# Patient Record
Sex: FEMALE | Race: WHITE | NOT HISPANIC OR LATINO | Employment: OTHER | URBAN - METROPOLITAN AREA
[De-identification: names, ages, dates, MRNs, and addresses within clinical notes are randomized per-mention and may not be internally consistent; named-entity substitution may affect disease eponyms.]

---

## 2022-09-13 ENCOUNTER — OFFICE VISIT (OUTPATIENT)
Dept: URGENT CARE | Facility: CLINIC | Age: 82
End: 2022-09-13
Payer: MEDICARE

## 2022-09-13 VITALS
TEMPERATURE: 97 F | DIASTOLIC BLOOD PRESSURE: 86 MMHG | SYSTOLIC BLOOD PRESSURE: 142 MMHG | HEIGHT: 67 IN | BODY MASS INDEX: 31.39 KG/M2 | OXYGEN SATURATION: 100 % | WEIGHT: 200 LBS | HEART RATE: 78 BPM | RESPIRATION RATE: 16 BRPM

## 2022-09-13 DIAGNOSIS — R10.31 RIGHT LOWER QUADRANT ABDOMINAL PAIN: Primary | ICD-10-CM

## 2022-09-13 PROCEDURE — G0463 HOSPITAL OUTPT CLINIC VISIT: HCPCS | Performed by: PHYSICIAN ASSISTANT

## 2022-09-13 PROCEDURE — 99213 OFFICE O/P EST LOW 20 MIN: CPT | Performed by: PHYSICIAN ASSISTANT

## 2022-09-13 RX ORDER — PAROXETINE 10 MG/1
10 TABLET, FILM COATED ORAL DAILY
COMMUNITY
Start: 2022-08-27

## 2022-09-13 NOTE — PROGRESS NOTES
[address]        NAME: Paulette Ocasio is a 78 y o  female  : 1940    MRN: [id number]  DATE: 2022  TIME: 6:35 PM    Assessment and Plan   Right lower quadrant abdominal pain [R10 31]  1  Right lower quadrant abdominal pain       Patient with symptoms for 1 month without acute changes  No other red flag symptoms for acute infection at this time  Will have patient monitor restrictions shortness with ER symptoms worsen any way  Advised follow-up with PCP  Patient Instructions   Go to ER symptoms worsen any way  Follow up with family doctor for additional evaluation  Follow up with PCP in 3-5 days  Proceed to  ER if symptoms worsen  Chief Complaint     Chief Complaint   Patient presents with    Abdominal Pain     Rlq pain x1 month when touching area  No change in bowel habits   No n/v or diarrhea         History of Present Illness       Patient is an 78-year-old female with no significant past medical history presents the office complaining of right lower quadrant pain for 1 month  Pain is rated 1/10 at rest described as "I just know something is there" and described as 7/10 "pain" when pushing on the area  Denies fevers, chills, nausea, vomiting, urinary symptoms, or change in bowel habits  Symptoms are not getting better or worse but she is tired of having this sensation of pain  Reports she has all her organs  Review of Systems   Review of Systems   Constitutional: Negative for chills and fever  HENT: Negative for congestion and sore throat  Respiratory: Negative for cough and shortness of breath  Cardiovascular: Negative for chest pain and palpitations  Gastrointestinal: Positive for abdominal pain  Negative for diarrhea, nausea and vomiting  Skin: Negative for rash  Neurological: Negative for dizziness, light-headedness and headaches           Current Medications       Current Outpatient Medications:     PARoxetine (PAXIL) 10 mg tablet, Take 10 mg by mouth daily, Disp: , Rfl:     Current Allergies     Allergies as of 09/13/2022 - Reviewed 09/13/2022   Allergen Reaction Noted    Mirabegron Rash 04/02/2015            The following portions of the patient's history were reviewed and updated as appropriate: allergies, current medications, past family history, past medical history, past social history, past surgical history and problem list      Past Medical History:   Diagnosis Date    Depression        Past Surgical History:   Procedure Laterality Date    NO PAST SURGERIES         Family History   Problem Relation Age of Onset    Completed Suicide  Mother     Cancer Father          Medications have been verified  Objective   /86   Pulse 78   Temp (!) 97 °F (36 1 °C)   Resp 16   Ht 5' 7" (1 702 m)   Wt 90 7 kg (200 lb)   SpO2 100%   BMI 31 32 kg/m²   No LMP recorded  Patient is postmenopausal        Physical Exam     Physical Exam  Vitals and nursing note reviewed  Constitutional:       Appearance: Normal appearance  She is well-developed  HENT:      Head: Normocephalic and atraumatic  Right Ear: Tympanic membrane, ear canal and external ear normal       Left Ear: Tympanic membrane, ear canal and external ear normal       Nose: Nose normal       Mouth/Throat:      Pharynx: Uvula midline  Eyes:      General: Lids are normal       Conjunctiva/sclera: Conjunctivae normal       Pupils: Pupils are equal, round, and reactive to light  Cardiovascular:      Rate and Rhythm: Normal rate and regular rhythm  Pulses: Normal pulses  Heart sounds: Normal heart sounds  No murmur heard  No friction rub  No gallop  Pulmonary:      Effort: Pulmonary effort is normal       Breath sounds: Normal breath sounds  No wheezing, rhonchi or rales  Chest:      Chest wall: No tenderness  Abdominal:      General: Bowel sounds are normal       Palpations: Abdomen is soft  Tenderness:  There is abdominal tenderness in the right lower quadrant  There is no guarding or rebound  Positive signs include McBurney's sign  Negative signs include Foley's sign, Rovsing's sign and psoas sign  Musculoskeletal:         General: Normal range of motion  Cervical back: Neck supple  Lymphadenopathy:      Cervical: No cervical adenopathy  Skin:     General: Skin is warm and dry  Capillary Refill: Capillary refill takes less than 2 seconds  Neurological:      Mental Status: She is alert